# Patient Record
Sex: FEMALE | Race: WHITE
[De-identification: names, ages, dates, MRNs, and addresses within clinical notes are randomized per-mention and may not be internally consistent; named-entity substitution may affect disease eponyms.]

---

## 2017-04-26 ENCOUNTER — HOSPITAL ENCOUNTER (OUTPATIENT)
Dept: HOSPITAL 45 - C.LABPVFM | Age: 72
Discharge: HOME | End: 2017-04-26
Attending: FAMILY MEDICINE
Payer: COMMERCIAL

## 2017-04-26 DIAGNOSIS — E55.9: ICD-10-CM

## 2017-04-26 DIAGNOSIS — R53.83: Primary | ICD-10-CM

## 2017-04-26 LAB
ALBUMIN/GLOB SERPL: 1 {RATIO} (ref 0.9–2)
ALP SERPL-CCNC: 83 U/L (ref 45–117)
ALT SERPL-CCNC: 46 U/L (ref 12–78)
ANION GAP SERPL CALC-SCNC: 5 MMOL/L (ref 3–11)
AST SERPL-CCNC: 22 U/L (ref 15–37)
BASOPHILS # BLD: 0.03 K/UL (ref 0–0.2)
BASOPHILS NFR BLD: 0.5 %
BUN SERPL-MCNC: 18 MG/DL (ref 7–18)
BUN/CREAT SERPL: 19 (ref 10–20)
CALCIUM SERPL-MCNC: 9.5 MG/DL (ref 8.5–10.1)
CHLORIDE SERPL-SCNC: 110 MMOL/L (ref 98–107)
CO2 SERPL-SCNC: 29 MMOL/L (ref 21–32)
COMPLETE: YES
CREAT SERPL-MCNC: 0.94 MG/DL (ref 0.6–1.2)
EOSINOPHIL NFR BLD AUTO: 230 K/UL (ref 130–400)
GLOBULIN SER-MCNC: 3.4 GM/DL (ref 2.5–4)
GLUCOSE SERPL-MCNC: 104 MG/DL (ref 70–99)
HCT VFR BLD CALC: 47.4 % (ref 37–47)
IG%: 0.2 %
IMM GRANULOCYTES NFR BLD AUTO: 27 %
LYME DISEASE AB IGG: (no result)
LYME DISEASE AB IGM: (no result)
LYMPHOCYTES # BLD: 1.58 K/UL (ref 1.2–3.4)
MAGNESIUM SERPL-MCNC: 2.5 MG/DL (ref 1.8–2.4)
MCH RBC QN AUTO: 30.1 PG (ref 25–34)
MCHC RBC AUTO-ENTMCNC: 31.6 G/DL (ref 32–36)
MCV RBC AUTO: 95.2 FL (ref 80–100)
MONOCYTES NFR BLD: 8.9 %
NEUTROPHILS # BLD AUTO: 1.2 %
NEUTROPHILS NFR BLD AUTO: 62.2 %
PMV BLD AUTO: 11.9 FL (ref 7.4–10.4)
POTASSIUM SERPL-SCNC: 4.3 MMOL/L (ref 3.5–5.1)
RBC # BLD AUTO: 4.98 M/UL (ref 4.2–5.4)
SODIUM SERPL-SCNC: 144 MMOL/L (ref 136–145)
WBC # BLD AUTO: 5.86 K/UL (ref 4.8–10.8)

## 2017-05-01 ENCOUNTER — HOSPITAL ENCOUNTER (OUTPATIENT)
Dept: HOSPITAL 45 - C.ULTR | Age: 72
Discharge: HOME | End: 2017-05-01
Attending: FAMILY MEDICINE
Payer: COMMERCIAL

## 2017-05-01 DIAGNOSIS — Z13.6: Primary | ICD-10-CM

## 2017-05-01 NOTE — DIAGNOSTIC IMAGING REPORT
ULTRASOUND EXAM AAA SCREEN



CLINICAL HISTORY: Z13.6 Screening for AAA    



COMPARISON STUDY:  No previous studies for comparison.



FINDINGS: There is no evidence of abdominal aortic aneurysm. The maximal aortic

diameter is 28 mm. The right iliac measures 13 mm in maximal diameter. The left

iliac measures 12 mm in maximal diameter. The liver is of slightly increased

echogenicity. Hepatic steatosis cannot be excluded.



IMPRESSION:  No evidence of abdominal aortic aneurysm. 









Electronically signed by:  Saad Hopson M.D.

5/1/2017 9:16 AM



Dictated Date/Time:  5/1/2017 9:15 AM

## 2017-05-03 NOTE — CODING QUERY MEDICAL NECESSITY
CQSUPPORTING DIAGNOSIS NEEDED





A supporting diagnosis is required for the test/procedure performed on this patient in 
order for us to be reimbursed by the patient's insurance. Please provide a supporting 
diagnosis for the following test/procedure listed below next to the test name along with 
your signature. 



*If there is no additional diagnosis for this patient that would support the following 
test/procedure please document that below next to the test/procedure.



Test(s)/Procedure(s) that require a supporting diagnosis:



DOS     04/26/17   VITAMIN D TEST





Provider Signature:  ______________________________  Date:  _______



Thank you  

Gia Marshall

Health Information Management

Phone:  [phone]

Fax:  [phone]



Once completed, please kindly fax back to [phone]



For questions please call [phone]

## 2017-05-18 ENCOUNTER — HOSPITAL ENCOUNTER (OUTPATIENT)
Dept: HOSPITAL 45 - C.CTS | Age: 72
Discharge: HOME | End: 2017-05-18
Attending: INTERNAL MEDICINE
Payer: COMMERCIAL

## 2017-05-18 DIAGNOSIS — J31.0: Primary | ICD-10-CM

## 2017-05-18 DIAGNOSIS — J47.9: ICD-10-CM

## 2017-05-18 DIAGNOSIS — J45.909: ICD-10-CM

## 2017-05-18 DIAGNOSIS — R94.2: ICD-10-CM

## 2017-05-18 DIAGNOSIS — R05: ICD-10-CM

## 2017-05-18 DIAGNOSIS — K76.0: ICD-10-CM

## 2017-05-18 DIAGNOSIS — R91.8: ICD-10-CM

## 2017-05-18 NOTE — DIAGNOSTIC IMAGING REPORT
CHEST CT WITHOUT CONTRAST



CT DOSE: 836.29 mGy.cm



HISTORY:      J45.909 [id number] EjnajW45.0 HxzzbcerZ69.2 Abnormal PFTs (pulmon



TECHNIQUE: Multiaxial CT images of the chest were performed without contrast.



COMPARISON: None.



FINDINGS: Bibasilar linear densities favor subsegmental atelectasis. No focal

lung consolidations to suggest pneumonia. There multiple scattered 3 mm

indeterminate pulmonary nodules seen within the upper lung zones. There are

approximately 20 nodules in total. No suspicious lytic or blastic osseous

lesions. Calcified nodule within the left thyroid lobe measuring 9 mm. Tiny

hiatus hernia. Hepatic steatosis. Focal fatty sparing at the caudate lobe. A 9

mm hypodense lesion within the left hepatic lobe is too small to characterize.

The visualized spleen and adrenal glands are unremarkable. Normal caliber

thoracic aorta. The heart is normal in size. No pneumothorax. No pleural

effusions.



IMPRESSION: 



1. No focal lung consolidations to suggest pneumonia. Bibasilar linear densities

favor subsegmental atelectasis.

2. There are approximately 20 nodules scattered within the bilateral upper lung

zones measuring up to 3 mm in size. These are indeterminate but favor benign

inflammatory/infectious process. Six-month chest CT follow-up can be performed

to ensure stability.

3. Hepatic steatosis. 







Electronically signed by:  Rip Dye M.D.

5/18/2017 2:03 PM



Dictated Date/Time:  5/18/2017 1:54 PM

## 2017-05-18 NOTE — DIAGNOSTIC IMAGING REPORT
SINUSES MIN 3 VIEWS ROUTINE



CLINICAL HISTORY: J31.0 [id number]   



COMPARISON STUDY:  None.



FINDINGS: The paranasal sinuses and mastoid air cells are clear. No fluid

levels. The nasal septum is essentially midline. Lamina papyracea and orbital

floors appear intact.



IMPRESSION:  The paranasal sinuses and mastoid air cells are clear. 









Electronically signed by:  Rip Dye M.D.

5/18/2017 2:34 PM



Dictated Date/Time:  5/18/2017 2:33 PM

## 2017-05-24 ENCOUNTER — HOSPITAL ENCOUNTER (OUTPATIENT)
Dept: HOSPITAL 45 - C.MAMM | Age: 72
Discharge: HOME | End: 2017-05-24
Attending: OBSTETRICS & GYNECOLOGY
Payer: COMMERCIAL

## 2017-05-24 DIAGNOSIS — Z12.31: Primary | ICD-10-CM

## 2017-05-24 DIAGNOSIS — N63: ICD-10-CM

## 2017-05-25 NOTE — MAMMOGRAPHY REPORT
BILATERAL DIGITAL SCREENING MAMMOGRAM WITH CAD: 5/24/2017

CLINICAL HISTORY: Routine screening.  Patient has no complaints.  





TECHNIQUE: Bilateral CC, MLO and right cleavage views were obtained.  Current study was also evaluat
ed with a Computer Aided Detection (CAD) system.  



COMPARISON: Comparison is made to exams dated:  5/15/2014 mammogram, 9/14/2015 mammogram, 4/19/2011 
mammogram - [facility], 3/22/2007, and 3/22/2007.   



BREAST COMPOSITION:  The tissue of both breasts is almost entirely fatty.  



FINDINGS:  There is a circumscribed 4 mm mass in the 3:00 middle one third of the left breast, that 
has slowly increased in size compared to prior available mammograms.  Although this could represent 
a cyst, definitive characterization with targeted ultrasound is recommended.



There are stable postsurgical changes in the 11:00 to 12:00 right breast, with 3 punctate densities 
remaining near the surgical site.  No other suspicious mass, architectural distortion or cluster of 
microcalcifications is seen.  



IMPRESSION:  ACR BI-RADS CATEGORY 0: INCOMPLETE EVALUATION:  NEED ADDITIONAL IMAGING EVALUATION

The circumscribed 4 mm mass in the left breast needs additional evaluation.  

The patient will be called to schedule an appointment.  





Approximately 10% of breast cancers are not detected with mammography. A negative mammographic repor
t should not delay biopsy if a clinically suggestive mass is present.



Kasie Ramos M.D.          

ay/:5/24/2017 15:25:54  



Imaging Technologist: Tony VANEGAS(KARIS)(NIRAJ), [facility]

letter sent: Addl Imaging 0  

BI-RADS Code: ACR BI-RADS Category 0: Incomplete Evaluation:  Need Additional Imaging Evaluation

## 2017-06-07 ENCOUNTER — HOSPITAL ENCOUNTER (OUTPATIENT)
Dept: HOSPITAL 45 - C.MAMM | Age: 72
Discharge: HOME | End: 2017-06-07
Attending: OBSTETRICS & GYNECOLOGY
Payer: COMMERCIAL

## 2017-06-07 DIAGNOSIS — N60.02: ICD-10-CM

## 2017-06-07 DIAGNOSIS — N63: Primary | ICD-10-CM

## 2017-06-07 NOTE — MAMMOGRAPHY REPORT
UNILATERAL LEFT DIGITAL DIAGNOSTIC MAMMOGRAM TOMOSYNTHESIS AND TARGETED LEFT ULTRASOUND: 6/7/2017

CLINICAL HISTORY: Callback from screening mammogram for left breast mass.  





TECHNIQUE:  Breast tomosynthesis in addition to standard 2D mammography was performed.  Spot compress
ion left CC and MLO 2-D and tomosynthesis images were obtained.



COMPARISON: Comparison is made to exams dated:  5/24/2017 mammogram, 9/14/2015 mammogram, 5/15/2014 m
ammogram, 4/19/2011 mammogram - [facility], 3/22/2007, and 3/22/2007.   



BREAST COMPOSITION:  The tissue of the left breast is almost entirely fatty.  



FINDINGS: Spot compression views of the left breast demonstrate a persistent oval circumscribed 5 mm 
mass within the left breast at approximately 3:00.  Targeted ultrasound was performed of the region o
f the mammographic mass.  In the left breast at 3:00 periareolar region, there is an oval circumscrib
ed anechoic 4 x 4 x 2 mm mass.  This corresponds with the mammographic mass and is consistent with a 
benign simple cyst.



IMPRESSION:  ACR BI-RADS CATEGORY 2: BENIGN, TARGETED ULTRASOUND ACR BI-RADS CATEGORY 2: BENIGN 

Benign 4 mm simple cyst in the left breast at 3:00 on ultrasound, which corresponds with the mammogra
phic mass.  There is no mammographic or targeted sonographic evidence of malignancy. A 1 year screeni
ng mammogram is recommended.  The patient has been verbally notified of the results.  





Approximately 10% of breast cancers are not detected with mammography. A negative mammographic report
 should not delay biopsy if a clinically suggestive mass is present.



Arlene Ellsworth M.D.          

ah/:6/7/2017 13:08:46  



Imaging Technologist: Yadira VANEAGS(KARIS)(M), [facility]

letter sent: Normal 1/2  

BI-RADS Code: ACR BI-RADS Category 2: Benign  Ultrasound BI-RADS: ACR BI-RADS Category 2: Benign

## 2017-06-21 ENCOUNTER — HOSPITAL ENCOUNTER (OUTPATIENT)
Dept: HOSPITAL 45 - C.LABPVFM | Age: 72
Discharge: HOME | End: 2017-06-21
Attending: INTERNAL MEDICINE
Payer: COMMERCIAL

## 2017-06-21 DIAGNOSIS — R05: ICD-10-CM

## 2017-06-21 DIAGNOSIS — J45.909: Primary | ICD-10-CM

## 2017-06-21 DIAGNOSIS — R00.2: ICD-10-CM

## 2017-06-21 DIAGNOSIS — J47.9: ICD-10-CM

## 2017-06-21 LAB
ALBUMIN/GLOB SERPL: 1 {RATIO} (ref 0.9–2)
ALP SERPL-CCNC: 74 U/L (ref 45–117)
ALT SERPL-CCNC: 45 U/L (ref 12–78)
ANION GAP SERPL CALC-SCNC: 7 MMOL/L (ref 3–11)
AST SERPL-CCNC: 27 U/L (ref 15–37)
BASOPHILS # BLD: 0.02 K/UL (ref 0–0.2)
BASOPHILS NFR BLD: 0.3 %
BUN SERPL-MCNC: 19 MG/DL (ref 7–18)
BUN/CREAT SERPL: 17.1 (ref 10–20)
CALCIUM SERPL-MCNC: 9.1 MG/DL (ref 8.5–10.1)
CHLORIDE SERPL-SCNC: 111 MMOL/L (ref 98–107)
CO2 SERPL-SCNC: 26 MMOL/L (ref 21–32)
COMPLETE: YES
CREAT SERPL-MCNC: 1.1 MG/DL (ref 0.6–1.2)
EOSINOPHIL NFR BLD AUTO: 204 K/UL (ref 130–400)
GLOBULIN SER-MCNC: 3.3 GM/DL (ref 2.5–4)
GLUCOSE SERPL-MCNC: 103 MG/DL (ref 70–99)
HCT VFR BLD CALC: 44.9 % (ref 37–47)
IG%: 0.3 %
IMM GRANULOCYTES NFR BLD AUTO: 25.9 %
INR PPP: 1.1 (ref 0.9–1.1)
LYMPHOCYTES # BLD: 1.62 K/UL (ref 1.2–3.4)
MCH RBC QN AUTO: 30.8 PG (ref 25–34)
MCHC RBC AUTO-ENTMCNC: 32.5 G/DL (ref 32–36)
MCV RBC AUTO: 94.7 FL (ref 80–100)
MONOCYTES NFR BLD: 7.8 %
NEUTROPHILS # BLD AUTO: 0.6 %
NEUTROPHILS NFR BLD AUTO: 65.1 %
PARTIAL THROMBOPLASTIN RATIO: 1
PMV BLD AUTO: 11.7 FL (ref 7.4–10.4)
POTASSIUM SERPL-SCNC: 4 MMOL/L (ref 3.5–5.1)
PROTHROMBIN TIME: 11.6 SECONDS (ref 9–12)
RBC # BLD AUTO: 4.74 M/UL (ref 4.2–5.4)
SODIUM SERPL-SCNC: 144 MMOL/L (ref 136–145)
WBC # BLD AUTO: 6.25 K/UL (ref 4.8–10.8)

## 2017-06-29 ENCOUNTER — HOSPITAL ENCOUNTER (OUTPATIENT)
Dept: HOSPITAL 45 - C.ACU | Age: 72
Discharge: HOME | End: 2017-06-29
Attending: INTERNAL MEDICINE
Payer: COMMERCIAL

## 2017-06-29 VITALS — OXYGEN SATURATION: 100 % | DIASTOLIC BLOOD PRESSURE: 97 MMHG | SYSTOLIC BLOOD PRESSURE: 139 MMHG | HEART RATE: 79 BPM

## 2017-06-29 VITALS
HEART RATE: 65 BPM | TEMPERATURE: 97.52 F | SYSTOLIC BLOOD PRESSURE: 121 MMHG | DIASTOLIC BLOOD PRESSURE: 77 MMHG | OXYGEN SATURATION: 97 %

## 2017-06-29 VITALS
DIASTOLIC BLOOD PRESSURE: 83 MMHG | OXYGEN SATURATION: 96 % | TEMPERATURE: 97.52 F | SYSTOLIC BLOOD PRESSURE: 130 MMHG | HEART RATE: 72 BPM

## 2017-06-29 VITALS
HEIGHT: 65 IN | BODY MASS INDEX: 41.14 KG/M2 | HEIGHT: 65 IN | WEIGHT: 246.92 LBS | BODY MASS INDEX: 41.14 KG/M2 | WEIGHT: 246.92 LBS

## 2017-06-29 VITALS — HEART RATE: 73 BPM | DIASTOLIC BLOOD PRESSURE: 76 MMHG | SYSTOLIC BLOOD PRESSURE: 118 MMHG | OXYGEN SATURATION: 96 %

## 2017-06-29 VITALS
HEART RATE: 75 BPM | TEMPERATURE: 97.88 F | SYSTOLIC BLOOD PRESSURE: 124 MMHG | OXYGEN SATURATION: 94 % | DIASTOLIC BLOOD PRESSURE: 61 MMHG

## 2017-06-29 VITALS
HEART RATE: 75 BPM | TEMPERATURE: 97.88 F | OXYGEN SATURATION: 94 % | SYSTOLIC BLOOD PRESSURE: 124 MMHG | DIASTOLIC BLOOD PRESSURE: 61 MMHG

## 2017-06-29 VITALS — OXYGEN SATURATION: 95 % | HEART RATE: 67 BPM | DIASTOLIC BLOOD PRESSURE: 78 MMHG | SYSTOLIC BLOOD PRESSURE: 119 MMHG

## 2017-06-29 VITALS — SYSTOLIC BLOOD PRESSURE: 116 MMHG | DIASTOLIC BLOOD PRESSURE: 74 MMHG | OXYGEN SATURATION: 98 % | HEART RATE: 74 BPM

## 2017-06-29 VITALS
HEART RATE: 75 BPM | TEMPERATURE: 98.06 F | DIASTOLIC BLOOD PRESSURE: 77 MMHG | OXYGEN SATURATION: 94 % | SYSTOLIC BLOOD PRESSURE: 134 MMHG

## 2017-06-29 VITALS — SYSTOLIC BLOOD PRESSURE: 133 MMHG | HEART RATE: 69 BPM | DIASTOLIC BLOOD PRESSURE: 86 MMHG | OXYGEN SATURATION: 99 %

## 2017-06-29 VITALS — SYSTOLIC BLOOD PRESSURE: 118 MMHG | DIASTOLIC BLOOD PRESSURE: 78 MMHG | HEART RATE: 69 BPM | OXYGEN SATURATION: 95 %

## 2017-06-29 VITALS — OXYGEN SATURATION: 98 % | SYSTOLIC BLOOD PRESSURE: 125 MMHG | HEART RATE: 67 BPM | DIASTOLIC BLOOD PRESSURE: 76 MMHG

## 2017-06-29 VITALS — SYSTOLIC BLOOD PRESSURE: 128 MMHG | DIASTOLIC BLOOD PRESSURE: 75 MMHG | HEART RATE: 84 BPM | OXYGEN SATURATION: 93 %

## 2017-06-29 VITALS — SYSTOLIC BLOOD PRESSURE: 129 MMHG | DIASTOLIC BLOOD PRESSURE: 75 MMHG | HEART RATE: 75 BPM | OXYGEN SATURATION: 95 %

## 2017-06-29 VITALS — OXYGEN SATURATION: 97 % | SYSTOLIC BLOOD PRESSURE: 128 MMHG | HEART RATE: 68 BPM | DIASTOLIC BLOOD PRESSURE: 79 MMHG

## 2017-06-29 VITALS — DIASTOLIC BLOOD PRESSURE: 77 MMHG | HEART RATE: 75 BPM | OXYGEN SATURATION: 95 % | SYSTOLIC BLOOD PRESSURE: 124 MMHG

## 2017-06-29 DIAGNOSIS — Z82.49: ICD-10-CM

## 2017-06-29 DIAGNOSIS — R91.1: ICD-10-CM

## 2017-06-29 DIAGNOSIS — J47.9: Primary | ICD-10-CM

## 2017-06-29 DIAGNOSIS — R94.2: ICD-10-CM

## 2017-06-29 DIAGNOSIS — E55.9: ICD-10-CM

## 2017-06-29 DIAGNOSIS — E66.9: ICD-10-CM

## 2017-06-29 DIAGNOSIS — Z80.3: ICD-10-CM

## 2017-06-29 DIAGNOSIS — J45.909: ICD-10-CM

## 2017-06-29 NOTE — DISCHARGE INSTRUCTIONS
Discharge Instructions


Date of Service


Jun 29, 2017.





Admission


Reason for Admission:  Asthma, Cough, Abnormal Cts





Discharge


Discharge Diagnosis / Problem:  chronic bronchitis





Discharge Goals


Goal(s):  Diagnostic testing





Activity Recommendations


Activity Limitations:  resume your previous activity





.





Current Hospital Diet


Patient's current hospital diet: Regular Diet





Discharge Diet


Recommended Diet:  Regular Diet





Procedures


Procedures Performed:  


Bronchoscopy with bronchial lavage and conscious sedation





Pending Studies


Studies pending at discharge:  yes


List of pending studies:  


Microbiologic analysis of the bronchial lavage





Medical Emergencies








.


Who to Call and When:





Medical Emergencies:  If at any time you feel your situation is an emergency, 

please call 911 immediately.





.





Non-Emergent Contact


Non-Emergency issues call your:  Pulmonologist


Call Non-Emergent contact if:  temperature is above 101.5





.


.








"Provider Documentation" section prepared by Azar Hoyt.








.





VTE Core Measure


Inpt VTE Proph given/why not?:  Treatment not indicated

## 2017-06-29 NOTE — PROCEDURE NOTE
Pre-Mod Sedation Assessment


General


Date of Moderate Sedation:


Jun 29, 2017.


Vital Signs:





Vital Signs Past 12 Hours








  Date Time  Temp Pulse Resp B/P (MAP) Pulse Ox O2 Delivery O2 Flow Rate FiO2


 


6/29/17 06:56 36.6 75 20 124/61 (82) 94 Room Air  











Review


Cardiovascular:  regular rate, rhythm, no edema, no gallop, no JVD, no murmur, 

normal peripheral pulses


Abdomen:  normal bowel sounds, non tender, soft, no organomegaly, no pulsatile 

mass


Lungs:  chest non-tender, lungs clear, normal breath sounds, no respiratory 

distress, no accessory muscle use


Airway Class:  II





Pre-Sedation Airway Assessment


Oral Cavity:  Capped Teeth


Able to Visualize Vocal Cords:  Yes


Short Thick Neck:  Yes


Hx of Sleep Apnea:  No


Smoking Status:  Never Smoker


Mallampati Classification:  Class II


ASA Classification:  Class II





Procedure Planning


Contraindications-for Mod Sed:  None


Yes





Notes








The planned sedation has been discussed with the patient and consent obtained.  

I have


identified the patient, determined the appropriateness of sedation and have 

assessed the


patient immediately prior to the procedure.  





All medicine(s) and interventions are by my order.

## 2017-06-29 NOTE — BRONCHOSCOPY PROCEDURE NOTE
Bronchoscopy Procedure Note


Procedure: Bronchoscopy, conscious sedation, bronchial lavage right middle lobe


Consent: Obtained through the patient placed into the chart


Pre-procedural diagnosis: Chronic bronchiectasis


Post-procedural diagnosis: Chronic bronchiectasis


Start time: 0818   


End time: 0839


Total time: 21 minutes


Analgesia:


   2% liquid lidocaine: Via nebulizer


   4% gel lidocaine: Via right naris


   2% liquid lidocaine: Via bronchoscopy


Sedation:


   Versed IV:  2 mg


   Fentanyl IV:  50 g


Procedure:


The [employer] video bronchoscope was used for this procedure and passed down 

through the right naris


Right naris/posterior naris/posterior oropharynx: Anatomically within normal 

limits


Glottis: Anatomically within normal limits


Vocal cords: Proper abduction and abduction, anatomically within normal limits 


Subglottis/trachea/Shae: Anatomically within normal limits


Right bronchial tree:


   Right mainstem bronchus: Anatomically within normal limits


   Right upper lobe: Anatomically within normal limits


   Bronchus intermedius: Anatomically within normal limits


   Right middle lobe: Anatomically within normal limits


   Right lower lobe: Anatomically within normal limits


   Findings: No significant findings noted


Left bronchial tree:


   Left mainstem bronchus: Anatomically within normal limits


   Left upper lobe: Anatomically within normal limits


   Lingula: Anatomically within normal limits


   Left lower lobe: Anatomically within normal limits


   Findings: No significant findings noted


Bronchial alveolar lavage:


   Right middle lobe


EBL:   


   None


Complications:


   None


Follow-up:


   In the [facility]

## 2017-06-29 NOTE — HISTORY AND PHYSICAL
History & Physical


Date


Jun 29, 2017.





History of Present Illness


71-year-old female here for follow-up on abnormal pulmonary function studies 

and abnormal CT scan, chronic bronchitis:








Past Medical/Surgical History


71-year-old female here for follow-up on abnormal pulmonary function studies 

and abnormal CT scan, chronic bronchitis:





The patient is transferred from Dr. Katie Berkowitz and she has had a chronic 

history bronchiectasis with recurrent bouts since 2005. With this we ordered 

repeat pulmonary function studies and high-resolution CT scan of the lungs for 

further evaluation. Myself and the patient went through her pulmonary function 

test and there were notably within normal limits but some mild restrictive 

ventilatory disease most likely secondary to obesity her CT from 05/18/2017 did 

show bibasilar linear densities possible atelectasis, multiple bilateral upper 

lobe nodules 3 mm or less in size and some signs of atelectasis in the right 

middle lobe along with hepatic steatosis. At this time the patient denies: 

Productive cough, fever, chills, dyspnea on exertion, pleurisy or classic 

cardiac chest pain. 


 





1.Abnormal PFTs (mildly reduced lung volumes at 77%)


2.Asthma 


3.Bronchiectasis 


4.Candidal intertrigo 


5.Cough 


6.Dermatitis 


7.Insufficient social support 


8.Internal hemorrhoids 


9.Knee pain, left 


10.Leiomyoma of uterus (D25.9)


11.Mixed stress and urge urinary incontinence 


12.Nocturia 


13.Obesity 


14.Palpitations 


15.Rhinitis 


16.Umbilical hernia 


17.Urge incontinence of urine 


18.Vitamin D deficiency 





Surgical History


1. History of Biopsy Breast Open


 2. History of Dental Surgery


 3. History of Tonsillectomy





Family History


1. Family history of Dementia


 2. Family history of PAD (peripheral artery disease)


 3. Family history of Congestive Heart Failure


 4. Family history of abdominal aortic aneurysm 


 5. Family history of Breast Cancer


 6. Denied: Family history of Colon Cancer


 7. Family history of No Significant Family History


 8. Denied: Family history of Ovarian Cancer





Social History


 Denied: History of Alcohol


  Denied: History of Current Smoker


  Denied: History of Drug Use


  Insufficient social support (Z65.8)


  Never smoker





Current Meds


1. Asmanex 60 Metered Doses 220 MCG/INH Inhalation Aerosol Powder Breath 

Activated;


2. Vortex Valved Holding Chamber Device; use with albuterol;


3. ProAir  (90 Base) MCG/ACT Inhalation Aerosol Solution; INHALE 2 PUFFS


4. Ketoconazole 2 % External Cream; APPLY A THIN LAYER TO AFFECTED AREA(S) TWICE


5. Qnasl 80 MCG/ACT Nasal Aerosol Solution; INSTILL 2 SQUIRT Twice daily;


6. Vitamin D (Ergocalciferol) 11580 UNIT Oral Capsule; TAKE 1 CAPSULE WEEKLY


7. Acetaminophen 500 MG CAPS; Takes on a PRN basisl


8. Asmanex 120 Metered Doses 220 MCG/INH Inhalation Aerosol Powder





Additional History


Hepatic Disease:  No


Endocrine Disorder:  No


Kidney Disease:  No


Hypertension:  No


Heart Disease:  No


Bleeding Tendencies:  No


Infectious Diseases:  No





Allergies


Coded Allergies:  


     NO KNOWN DRUG ALLERGIES (Verified  Allergy, Unknown, ., 6/29/17)





Home Medications


Scheduled


Ketoconazole 2% (Nizoral 2%), 1 APPLN TOP PRN


Mometasone Furoate (Asmanex Twisthaler 30 Me), 1 PUFF INH BID


Montelukast Sodium (Singulair), 10 MG PO QAM





Scheduled PRN


Diphenhydramine-Acetaminophen (Tylenol Pm), 1 TAB PO HS PRN for Sleep


Omeprazole (Prilosec), 20 MG PO QAM PRN for PN





Physical Examination


Skin:  warm/dry, no rash


Eyes:  normal inspection, EOMI, sclerae normal


ENT:  normal ENT inspection, pharynx normal


Head:  normocephalic, atraumatic


Neck:  supple, no adenopathy, trachea midline


Respiratory/Chest:  lungs clear, normal breath sounds, no respiratory distress


Cardiovascular:  regular rate, rhythm, no edema, no murmur


Abdomen / GI:  normal bowel sounds, non tender


Back:  normal inspection


Extremities:  normal inspection, normal range of motion


Neurologic/Psych:  no motor/sensory deficits, alert, normal reflexes, oriented 

x 3





Diagnosis


71-year-old female here for follow-up on abnormal pulmonary function studies 

and abnormal CT scan:


ASA Classification:  ASA Class II





Plan of Treatment


Bronchoscopy with BAL for proximal airway evaluation and chronic infectious 

agent evaluation.

## 2017-07-06 NOTE — CODING QUERY MEDICAL NECESSITY
CQSUPPORTING DIAGNOSIS NEEDED





A supporting diagnosis is required for the test/procedure performed on this patient in 
order for us to be reimbursed by the patient's insurance. Please provide a supporting 
diagnosis for the following test/procedure listed below next to the test name along with 
your signature. 



*If there is no additional diagnosis for this patient that would support the following 
test/procedure please document that below next to the test/procedure.



Test(s)/Procedure(s) that require a supporting diagnosis:





DOS   06/29/17      BLOOD GLUCOSE TESTING       DONE DAY OF SURGERY





Provider Signature:  ______________________________  Date:  _______



Thank you  

Gia Marshall

Health Information Management

Phone:  [phone]

Fax:  [phone]



Once completed, please kindly fax back to [phone]



For questions please call [phone]

## 2017-07-14 LAB
HERPES SIMPLEX CULT SOURCE: (no result)
HSV SPEC CULT: (no result)

## 2017-07-19 NOTE — PROCEDURE NOTE
----- Message from Jennifer Washington NP sent at 7/19/2017  1:06 PM CDT -----  Call mom,CBC, UA ok   Post-Moderate Sedation Plan


General


Date of Moderate Sedation


Jun 29, 2017.


Vital Signs:





Vital Signs Past 12 Hours








  Date Time  Temp Pulse Resp B/P (MAP) Pulse Ox O2 Delivery O2 Flow Rate FiO2


 


6/29/17 08:45  67 20 119/78 95 Nasal Cannula 4 


 


6/29/17 08:40  69 16 118/78 95 Nasal Cannula 4.0 


 


6/29/17 08:35  84 16 128/75 93 Mask 8.0 


 


6/29/17 08:30  74 14 116/74 98 Mask 6.0 


 


6/29/17 08:25  67 14 125/76 98 Mask 6.0 


 


6/29/17 08:20  68 20 128/79 97 Mask 6.0 


 


6/29/17 08:15  69 18 133/86 99 Mask 6.0 


 


6/29/17 08:06  79 18 139/97 100 Mask 6.0 


 


6/29/17 07:59 36.6 75 20 124/61 94 Room Air  


 


6/29/17 06:56 36.6 75 20 124/61 (82) 94 Room Air  











Review - Discharge Plan


Post Moderate Sedation Plan:





On clinical assessment, the patient appears to have tolerated the conscious 

sedation 


without complications.





Patient is recovering as anticipated.





Patient will continue to be monitored by nursing and may be discharged when 

conscious 


sedation discharge criteria are met.

## 2017-10-30 ENCOUNTER — HOSPITAL ENCOUNTER (OUTPATIENT)
Dept: HOSPITAL 45 - C.LABPVFM | Age: 72
Discharge: HOME | End: 2017-10-30
Attending: FAMILY MEDICINE
Payer: COMMERCIAL

## 2017-10-30 DIAGNOSIS — E55.9: ICD-10-CM

## 2017-10-30 DIAGNOSIS — J45.909: Primary | ICD-10-CM

## 2017-10-30 DIAGNOSIS — R53.83: ICD-10-CM

## 2017-10-30 DIAGNOSIS — R73.9: ICD-10-CM

## 2017-10-30 DIAGNOSIS — E66.01: ICD-10-CM

## 2017-10-30 LAB
ALBUMIN/GLOB SERPL: 1 {RATIO} (ref 0.9–2)
ALP SERPL-CCNC: 80 U/L (ref 45–117)
ALT SERPL-CCNC: 47 U/L (ref 12–78)
ANION GAP SERPL CALC-SCNC: 6 MMOL/L (ref 3–11)
AST SERPL-CCNC: 27 U/L (ref 15–37)
BASOPHILS # BLD: 0.02 K/UL (ref 0–0.2)
BASOPHILS NFR BLD: 0.4 %
BUN SERPL-MCNC: 15 MG/DL (ref 7–18)
BUN/CREAT SERPL: 16.3 (ref 10–20)
CALCIUM SERPL-MCNC: 8.9 MG/DL (ref 8.5–10.1)
CHLORIDE SERPL-SCNC: 113 MMOL/L (ref 98–107)
CO2 SERPL-SCNC: 24 MMOL/L (ref 21–32)
COMPLETE: YES
CREAT SERPL-MCNC: 0.92 MG/DL (ref 0.6–1.2)
EOSINOPHIL NFR BLD AUTO: 182 K/UL (ref 130–400)
EST. AVERAGE GLUCOSE BLD GHB EST-MCNC: 123 MG/DL
GLOBULIN SER-MCNC: 3.3 GM/DL (ref 2.5–4)
GLUCOSE SERPL-MCNC: 102 MG/DL (ref 70–99)
HCT VFR BLD CALC: 47.6 % (ref 37–47)
IG%: 0.2 %
IMM GRANULOCYTES NFR BLD AUTO: 24.5 %
LYMPHOCYTES # BLD: 1.34 K/UL (ref 1.2–3.4)
MCH RBC QN AUTO: 30.3 PG (ref 25–34)
MCHC RBC AUTO-ENTMCNC: 32.6 G/DL (ref 32–36)
MCV RBC AUTO: 93.2 FL (ref 80–100)
MONOCYTES NFR BLD: 11.4 %
NEUTROPHILS # BLD AUTO: 1.3 %
NEUTROPHILS NFR BLD AUTO: 62.2 %
PLATELET # BLD EST: NORMAL 10*3/UL
PMV BLD AUTO: 12 FL (ref 7.4–10.4)
POTASSIUM SERPL-SCNC: 3.8 MMOL/L (ref 3.5–5.1)
RBC # BLD AUTO: 5.11 M/UL (ref 4.2–5.4)
SODIUM SERPL-SCNC: 143 MMOL/L (ref 136–145)
TSH SERPL-ACNC: 1.77 UIU/ML (ref 0.3–4.5)
WBC # BLD AUTO: 5.46 K/UL (ref 4.8–10.8)

## 2017-11-20 ENCOUNTER — HOSPITAL ENCOUNTER (OUTPATIENT)
Dept: HOSPITAL 45 - C.ULTR | Age: 72
Discharge: HOME | End: 2017-11-20
Attending: FAMILY MEDICINE
Payer: COMMERCIAL

## 2017-11-20 DIAGNOSIS — K42.9: Primary | ICD-10-CM

## 2017-11-20 DIAGNOSIS — R10.9: ICD-10-CM

## 2017-11-20 NOTE — DIAGNOSTIC IMAGING REPORT
ULTRASOUND ABDOMEN COMPLETE 



CLINICAL HISTORY: Generalized abdominal pain. Umbilical hernia.



COMPARISON STUDY: No priors.



TECHNIQUE: Real-time, grayscale, and color flow sonography of the abdomen was

performed. Images are reviewed in the transverse and longitudinal planes.



FINDINGS:



Liver: The liver is enlarged and demonstrates heterogeneously increased

echotexture consistent with severe hepatic steatosis. Note that this degrades

acoustic penetration of the liver. There is no intrahepatic biliary ductal

dilatation. The main portal vein is patent. A 1.1 cm cyst is noted in the left

lobe.



Gallbladder: The gallbladder is normal in appearance. No gallstones are

identified. There is no gallbladder wall thickening or pericholecystic fluid. A

sonographic Roberson's sign is reportedly absent. The common bile duct measures up

to 0.6 cm in diameter.



Pancreas: Visualized portions of the pancreatic head and body are normal in

appearance. The splenic vein is patent.



Spleen: The spleen is normal in size and echotexture, measuring 9.3 cm in

length.



Kidneys: The kidneys are normal in size and echotexture. There is no

hydronephrosis. The right kidney measures 9.7 cm in length and the left kidney

measures 10.3 cm in length. No shadowing calculi are identified.



Abdominal vasculature: Visualized portions of the abdominal aorta are normal in

caliber.



Ascites: None.



Abdominal wall: Survey images at the umbilicus show a large fat-containing

umbilical hernia. The hernia orifice measures up to 2.4 cm. This was partially

reducible during the examination.







IMPRESSION:  



1. Hepatomegaly and severe hepatic steatosis.



2. No gallstones are identified.



3. Fat-containing umbilical hernia.







Electronically signed by:  Ron Feldman M.D.

11/20/2017 12:43 PM



Dictated Date/Time:  11/20/2017 12:41 PM

## 2018-01-10 ENCOUNTER — HOSPITAL ENCOUNTER (OUTPATIENT)
Dept: HOSPITAL 45 - C.LABPVFM | Age: 73
Discharge: HOME | End: 2018-01-10
Attending: INTERNAL MEDICINE
Payer: COMMERCIAL

## 2018-01-10 DIAGNOSIS — R10.11: Primary | ICD-10-CM

## 2018-01-10 LAB
ALBUMIN SERPL-MCNC: 3.4 GM/DL (ref 3.4–5)
ALP SERPL-CCNC: 72 U/L (ref 45–117)
ALT SERPL-CCNC: 54 U/L (ref 12–78)
AST SERPL-CCNC: 25 U/L (ref 15–37)
BASOPHILS # BLD: 0.03 K/UL (ref 0–0.2)
BASOPHILS NFR BLD: 0.5 %
BUN SERPL-MCNC: 18 MG/DL (ref 7–18)
CALCIUM SERPL-MCNC: 9.3 MG/DL (ref 8.5–10.1)
CO2 SERPL-SCNC: 29 MMOL/L (ref 21–32)
CREAT SERPL-MCNC: 1.2 MG/DL (ref 0.6–1.2)
EOS ABS #: 0.08 K/UL (ref 0–0.5)
EOSINOPHIL NFR BLD AUTO: 205 K/UL (ref 130–400)
GLUCOSE SERPL-MCNC: 85 MG/DL (ref 70–99)
HCT VFR BLD CALC: 45.5 % (ref 37–47)
HGB BLD-MCNC: 14.6 G/DL (ref 12–16)
IG#: 0.01 K/UL (ref 0–0.02)
IMM GRANULOCYTES NFR BLD AUTO: 28.7 %
LIPASE: 83 U/L (ref 73–393)
LYMPHOCYTES # BLD: 1.79 K/UL (ref 1.2–3.4)
MCH RBC QN AUTO: 30.9 PG (ref 25–34)
MCHC RBC AUTO-ENTMCNC: 32.1 G/DL (ref 32–36)
MCV RBC AUTO: 96.2 FL (ref 80–100)
MONO ABS #: 0.59 K/UL (ref 0.11–0.59)
MONOCYTES NFR BLD: 9.5 %
NEUT ABS #: 3.74 K/UL (ref 1.4–6.5)
NEUTROPHILS # BLD AUTO: 1.3 %
NEUTROPHILS NFR BLD AUTO: 59.8 %
PMV BLD AUTO: 11.6 FL (ref 7.4–10.4)
POTASSIUM SERPL-SCNC: 4.1 MMOL/L (ref 3.5–5.1)
PROT SERPL-MCNC: 6.5 GM/DL (ref 6.4–8.2)
RED CELL DISTRIBUTION WIDTH CV: 14.4 % (ref 11.5–14.5)
RED CELL DISTRIBUTION WIDTH SD: 50.9 FL (ref 36.4–46.3)
SODIUM SERPL-SCNC: 141 MMOL/L (ref 136–145)
WBC # BLD AUTO: 6.24 K/UL (ref 4.8–10.8)

## 2018-02-16 ENCOUNTER — HOSPITAL ENCOUNTER (OUTPATIENT)
Dept: HOSPITAL 45 - C.CTS | Age: 73
Discharge: HOME | End: 2018-02-16
Attending: INTERNAL MEDICINE
Payer: COMMERCIAL

## 2018-02-16 DIAGNOSIS — R94.2: ICD-10-CM

## 2018-02-16 DIAGNOSIS — J47.9: Primary | ICD-10-CM

## 2018-02-16 NOTE — DIAGNOSTIC IMAGING REPORT
(CHEST) THORAX WITHOUT



CT DOSE: 663.85 mGy.cm



HISTORY: Bronchiectasis  R94.2 Abnormal PFTs (pulmonary function tests)J47.9

Bronchiectas



TECHNIQUE: Multiaxial CT images of the chest were performed without contrast.  A

dose lowering technique was utilized adhering to the principles of ALARA.





COMPARISON: 5/18/2017



FINDINGS: No evidence for focal infiltrative change. Minimal atelectatic

bronchiectatic change right middle lobe. This is unchanged from the prior study.



The diffuse mid to upper lung parenchymal nodularity is stable. There is no

evidence for progression. There is no significant mediastinal or hilar

adenopathy.



Limited evaluation the upper abdomen confirms fatty infiltration of liver.



IMPRESSION: 



1. Minimal stable atelectatic/bronchiectatic change right middle lobe. There are

2. Unchanged diffuse micronodular nodularity of the mid to upper lung regions

bilaterally. This is stable.

3. A 1 year follow-up is suggested. 









The above report was generated using voice recognition software.  It may contain

grammatical, syntax or spelling errors.







Electronically signed by:  Delfino Mera M.D.

2/16/2018 10:01 AM



Dictated Date/Time:  2/16/2018 9:56 AM

## 2018-04-25 ENCOUNTER — HOSPITAL ENCOUNTER (OUTPATIENT)
Dept: HOSPITAL 45 - C.LABPVFM | Age: 73
Discharge: HOME | End: 2018-04-25
Attending: FAMILY MEDICINE
Payer: COMMERCIAL

## 2018-04-25 DIAGNOSIS — E55.9: Primary | ICD-10-CM

## 2018-05-14 ENCOUNTER — HOSPITAL ENCOUNTER (OUTPATIENT)
Dept: HOSPITAL 45 - C.RDSM | Age: 73
Discharge: HOME | End: 2018-05-14
Attending: ORTHOPAEDIC SURGERY
Payer: COMMERCIAL
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
## 2018-05-14 DIAGNOSIS — S89.92XA: ICD-10-CM

## 2018-05-14 DIAGNOSIS — S89.91XA: Primary | ICD-10-CM

## 2018-05-14 NOTE — DIAGNOSTIC IMAGING REPORT
BILATERAL KNEES 5 VIEWS INCLUDING BILATERAL STANDING AP VIEWS



CLINICAL HISTORY: Bilateral knee pain status post trauma     



COMPARISON: 5/14/2018



DISCUSSION: No acute fractures or dislocations are visualized. There are minor

arthritic changes present.



IMPRESSION: No fractures or dislocations identified.







Electronically signed by:  Saad Hopson M.D.

5/14/2018 1:45 PM



Dictated Date/Time:  5/14/2018 1:44 PM

## 2018-08-01 ENCOUNTER — HOSPITAL ENCOUNTER (OUTPATIENT)
Dept: HOSPITAL 45 - C.LABPVFM | Age: 73
Discharge: HOME | End: 2018-08-01
Attending: FAMILY MEDICINE
Payer: COMMERCIAL

## 2018-08-01 DIAGNOSIS — R53.83: Primary | ICD-10-CM

## 2018-08-01 DIAGNOSIS — E55.9: ICD-10-CM

## 2018-08-01 DIAGNOSIS — R07.89: ICD-10-CM

## 2018-08-01 DIAGNOSIS — R42: ICD-10-CM

## 2018-08-01 LAB
ALBUMIN SERPL-MCNC: 3.5 GM/DL (ref 3.4–5)
ALP SERPL-CCNC: 69 U/L (ref 45–117)
ALT SERPL-CCNC: 45 U/L (ref 12–78)
AST SERPL-CCNC: 22 U/L (ref 15–37)
BASOPHILS # BLD: 0.02 K/UL (ref 0–0.2)
BASOPHILS NFR BLD: 0.3 %
BUN SERPL-MCNC: 19 MG/DL (ref 7–18)
CALCIUM SERPL-MCNC: 9.5 MG/DL (ref 8.5–10.1)
CO2 SERPL-SCNC: 28 MMOL/L (ref 21–32)
CREAT SERPL-MCNC: 1.13 MG/DL (ref 0.6–1.2)
EOS ABS #: 0.06 K/UL (ref 0–0.5)
EOSINOPHIL NFR BLD AUTO: 192 K/UL (ref 130–400)
GLUCOSE SERPL-MCNC: 88 MG/DL (ref 70–99)
HCT VFR BLD CALC: 48.8 % (ref 37–47)
HGB BLD-MCNC: 15.6 G/DL (ref 12–16)
IG#: 0.01 K/UL (ref 0–0.02)
IMM GRANULOCYTES NFR BLD AUTO: 27.6 %
KETONES UR QL STRIP: 92 MG/DL
LYMPHOCYTES # BLD: 1.86 K/UL (ref 1.2–3.4)
MCH RBC QN AUTO: 30.2 PG (ref 25–34)
MCHC RBC AUTO-ENTMCNC: 32 G/DL (ref 32–36)
MCV RBC AUTO: 94.4 FL (ref 80–100)
MONO ABS #: 0.53 K/UL (ref 0.11–0.59)
MONOCYTES NFR BLD: 7.9 %
NEUT ABS #: 4.26 K/UL (ref 1.4–6.5)
NEUTROPHILS # BLD AUTO: 0.9 %
NEUTROPHILS NFR BLD AUTO: 63.2 %
PH UR: 161 MG/DL (ref 0–200)
PMV BLD AUTO: 11.3 FL (ref 7.4–10.4)
POTASSIUM SERPL-SCNC: 4.5 MMOL/L (ref 3.5–5.1)
PROT SERPL-MCNC: 6.9 GM/DL (ref 6.4–8.2)
RED CELL DISTRIBUTION WIDTH CV: 14.1 % (ref 11.5–14.5)
RED CELL DISTRIBUTION WIDTH SD: 48.6 FL (ref 36.4–46.3)
SODIUM SERPL-SCNC: 142 MMOL/L (ref 136–145)
WBC # BLD AUTO: 6.74 K/UL (ref 4.8–10.8)

## 2018-08-07 ENCOUNTER — HOSPITAL ENCOUNTER (OUTPATIENT)
Dept: HOSPITAL 45 - C.ULTR | Age: 73
Discharge: HOME | End: 2018-08-07
Attending: FAMILY MEDICINE
Payer: COMMERCIAL

## 2018-08-07 DIAGNOSIS — R42: Primary | ICD-10-CM

## 2018-08-07 NOTE — DIAGNOSTIC IMAGING REPORT
CAROTID DOPPLER NECK ART



HISTORY: Mental status change  R42 Dizziness



COMPARISON: None.



TECHNIQUE: Real-time, grayscale, and color Doppler sonography of the carotid

arteries was performed. Imaging reviewed in the transverse and longitudinal

planes. All measurements were calculated based on NASCET criteria.



FINDINGS:

Antegrade flow is seen in the bilateral vertebral arteries. 

The brachial pressures are hemodynamically similar.

   



The peak systolic velocity within the right ICA is 77. The right systolic ratio

is 1.1.



The peak systolic velocity within the left ICA is 58. The left systolic ratio is

0.7.



IMPRESSION:  

No hemodynamically significant stenosis seen within the carotid arteries.









The above report was generated using voice recognition software.  It may contain

grammatical, syntax or spelling errors.







Electronically signed by:  Delfino eMra M.D.

8/7/2018 11:58 AM



Dictated Date/Time:  8/7/2018 11:58 AM

## 2018-08-15 ENCOUNTER — HOSPITAL ENCOUNTER (OUTPATIENT)
Dept: HOSPITAL 45 - C.NUCL | Age: 73
Discharge: HOME | End: 2018-08-15
Attending: FAMILY MEDICINE
Payer: COMMERCIAL

## 2018-08-15 DIAGNOSIS — I95.1: ICD-10-CM

## 2018-08-15 DIAGNOSIS — R07.89: Primary | ICD-10-CM

## 2018-08-15 DIAGNOSIS — R42: ICD-10-CM

## 2018-08-18 NOTE — MYOCARDIAL PERFUSION SCAN
NUCLEAR SRESS TEST

 

STUDY TITLE:  ONE-DAY NUCLEAR MEDICINE TECHNETIUM-99M CARDIOLITE MYOCARDIAL

PERFUSION SCAN

 

INDICATION:  Atypical chest pain, dizziness.

 

BASELINE ECHOCARDIOGRAM:  Normal sinus rhythm with a ventricular rate of 65,

no significant ST abnormalities.

 

STRESS ECHOCARDIOGRAM:  With Lexiscan, heart rate nacho from 65 to 88

representing 59% of maximum predicted heart rate.  There were no Lexiscan

induced ST changes or arrhythmias.

 

TECHNIQUE:  For the stress portion of the study, 32.5 mCi of technetium-99m

Cardiolite IV was injected at 11:15 a.m. on 08/15/2018.  Thirty minutes

following the injection, imaging of the heart was performed in multiple

projections.  For the rest portion of the study, 10.7 mCi of technetium-99m

Cardiolite was injected IV at 9:30 a.m.  One hour following the injection,

imaging of the heart was performed in the same projections.

 

FINDINGS:  Rotating raw images were reviewed in detail.  Arms were down with

imaging.  There was an anterior breast shadow noted on both stress and rest. 

Mild gut/liver uptake impacting the inferior imaging border of the heart. 

There was no significant extracardiac pathologic uptake.

 

The short axis, vertical long axis, horizontal long axis images were reviewed

in detail.  There was no visual TID.  There was a small primarily fixed

apical perfusion defect, most consistent with artifact in the setting of

breast attenuation.  Low suspicion for distal LAD ischemia.

 

LV size was normal with a calculated end-diastolic volume of 42 mL. 

Calculated EF was normal at 83% with no regional wall motion abnormalities.

 

IMPRESSION:

1.  Negative myocardial perfusion study for Lexiscan-induced ischemia.

2.  Normal left ventricular size, ejection fraction 83% with no regional wall

motion abnormalities.

3.  Nondiagnostic Lexiscan EKG due to inability to reach target heart rate.